# Patient Record
Sex: FEMALE | Race: WHITE | HISPANIC OR LATINO | Employment: UNEMPLOYED | URBAN - METROPOLITAN AREA
[De-identification: names, ages, dates, MRNs, and addresses within clinical notes are randomized per-mention and may not be internally consistent; named-entity substitution may affect disease eponyms.]

---

## 2023-06-29 ENCOUNTER — OFFICE VISIT (OUTPATIENT)
Dept: GASTROENTEROLOGY | Facility: AMBULARY SURGERY CENTER | Age: 33
End: 2023-06-29
Payer: COMMERCIAL

## 2023-06-29 VITALS
HEIGHT: 64 IN | OXYGEN SATURATION: 100 % | HEART RATE: 78 BPM | SYSTOLIC BLOOD PRESSURE: 122 MMHG | DIASTOLIC BLOOD PRESSURE: 70 MMHG | WEIGHT: 146 LBS | BODY MASS INDEX: 24.92 KG/M2

## 2023-06-29 DIAGNOSIS — K21.9 GASTROESOPHAGEAL REFLUX DISEASE WITHOUT ESOPHAGITIS: ICD-10-CM

## 2023-06-29 DIAGNOSIS — R10.13 DYSPEPSIA: Primary | ICD-10-CM

## 2023-06-29 PROCEDURE — 99244 OFF/OP CNSLTJ NEW/EST MOD 40: CPT | Performed by: INTERNAL MEDICINE

## 2023-06-29 RX ORDER — NORGESTIMATE AND ETHINYL ESTRADIOL 7DAYSX3 LO
1 KIT ORAL DAILY
COMMUNITY

## 2023-06-29 RX ORDER — METHOCARBAMOL 750 MG/1
TABLET, FILM COATED ORAL
COMMUNITY

## 2023-06-29 RX ORDER — FAMOTIDINE 20 MG/1
20 TABLET, FILM COATED ORAL 2 TIMES DAILY
Qty: 60 TABLET | Refills: 11 | Status: SHIPPED | OUTPATIENT
Start: 2023-06-29

## 2023-06-29 NOTE — PATIENT INSTRUCTIONS
Scheduled date of EGD(as of today): 08/23/23  Physician performing EGD: dr Rojas Whalen  Location of EGD:[facility]  Instructions reviewed with patient by:yari  Clearances:  hugo boyle

## 2023-06-29 NOTE — PROGRESS NOTES
Consultation - [facility]  Callie Ti 1990 female         Chief Complaint: GERD, discomfort in the stomach    HPI: 20-year-old female with no significant past medicine reports having issues with acid reflux for about 5 years  Complaining about chest pain with burping and acid reflux at times and also reports having discomfort in the stomach  Occasional nausea but denies any vomiting  Denies NSAID use  Good appetite, no recent weight loss  Regular bowel movements and denies any blood or mucus in the stool  Chaperon: Ms Anthony Mccracken: Review of Systems   Constitutional: Negative for activity change, appetite change, chills, diaphoresis, fatigue, fever and unexpected weight change  HENT: Negative for ear discharge, ear pain, facial swelling, hearing loss, nosebleeds, sore throat, tinnitus and voice change  Eyes: Negative for pain, discharge, redness, itching and visual disturbance  Respiratory: Negative for apnea, cough, chest tightness, shortness of breath and wheezing  Cardiovascular: Negative for chest pain and palpitations  Gastrointestinal:        As noted in HPI   Endocrine: Negative for cold intolerance, heat intolerance and polyuria  Genitourinary: Negative for difficulty urinating, dysuria, flank pain, hematuria and urgency  Musculoskeletal: Negative for arthralgias, back pain, gait problem, joint swelling and myalgias  Skin: Negative for rash and wound  Neurological: Negative for dizziness, tremors, seizures, speech difficulty, light-headedness, numbness and headaches  Hematological: Negative for adenopathy  Does not bruise/bleed easily  Psychiatric/Behavioral: Negative for agitation, behavioral problems and confusion  The patient is not nervous/anxious  Past Medical History:   Diagnosis Date   • Anxiety       History reviewed  No pertinent surgical history    Social History     Socioeconomic History   • Marital status: "/[employer]     Spouse name: Not on file   • Number of children: Not on file   • Years of education: Not on file   • Highest education level: Not on file   Occupational History   • Not on file   Tobacco Use   • Smoking status: Never   • Smokeless tobacco: Never   Vaping Use   • Vaping Use: Never used   Substance and Sexual Activity   • Alcohol use: Yes     Comment: Occs   • Drug use: Never   • Sexual activity: Not on file   Other Topics Concern   • Not on file   Social History Narrative   • Not on file     Social Determinants of Health     Financial Resource Strain: Not on file   Food Insecurity: Not on file   Transportation Needs: Not on file   Physical Activity: Not on file   Stress: Not on file   Social Connections: Not on file   Intimate Partner Violence: Not on file   Housing Stability: Not on file     History reviewed  No pertinent family history  Patient has no known allergies  Current Outpatient Medications   Medication Sig Dispense Refill   • famotidine (PEPCID) 20 mg tablet Take 1 tablet (20 mg total) by mouth 2 (two) times a day 60 tablet 11   • norgestimate-ethinyl estradiol (ORTHO TRI-CYCLEN LO) 0 18/0 215/0 25 MG-25 MCG per tablet Take 1 tablet by mouth daily     • methocarbamol (ROBAXIN) 750 mg tablet  (Patient not taking: Reported on 6/29/2023)       No current facility-administered medications for this visit  Blood pressure 122/70, pulse 78, height 5' 4\" (1 626 m), weight 66 2 kg (146 lb), SpO2 100 %  PHYSICAL EXAM: Physical Exam  Constitutional:       Appearance: Normal appearance  She is well-developed  HENT:      Head: Normocephalic and atraumatic  Nose: Nose normal    Eyes:      Conjunctiva/sclera: Conjunctivae normal    Neck:      Thyroid: No thyromegaly  Vascular: No JVD  Trachea: No tracheal deviation  Cardiovascular:      Rate and Rhythm: Normal rate and regular rhythm  Heart sounds: Normal heart sounds  No murmur heard  No friction rub   No " "gallop  Pulmonary:      Effort: Pulmonary effort is normal  No respiratory distress  Breath sounds: Normal breath sounds  No wheezing or rales  Abdominal:      General: Bowel sounds are normal  There is no distension  Palpations: Abdomen is soft  There is no mass  Tenderness: There is no abdominal tenderness  There is no guarding  Hernia: No hernia is present  Musculoskeletal:         General: No tenderness or deformity  Cervical back: Neck supple  Right lower leg: No edema  Left lower leg: No edema  Lymphadenopathy:      Cervical: No cervical adenopathy  Skin:     General: Skin is warm and dry  Findings: No erythema or rash  Neurological:      Mental Status: She is alert and oriented to person, place, and time  Psychiatric:         Mood and Affect: Mood normal          Behavior: Behavior normal          Thought Content: Thought content normal           No results found for: \"WBC\", \"HGB\", \"HCT\", \"MCV\", \"PLT\"  No results found for: \"GLUCOSE\", \"CALCIUM\", \"NA\", \"K\", \"CO2\", \"CL\", \"BUN\", \"CREATININE\"  No results found for: \"ALT\", \"AST\", \"GGT\", \"ALKPHOS\", \"BILITOT\"  No results found for: \"INR\", \"PROTIME\"    Patient was never admitted  ASSESSMENT & PLAN:    Dyspepsia  Rule out peptic ulcer disease or gastric erosions  Rule out H  pylori gastritis     -Patient was explained about the lifestyle and dietary modifications  Advised to avoid fatty foods, chocolates, caffeine, alcohol and any other triggering foods  Avoid eating for at least 3 hours before going to bed     -Scheduled for EGD    -We will put her on Pepcid twice daily    -Patient was explained about  the risks and benefits of the procedure  Risks including but not limited to bleeding, infection, perforation were explained in detail  Also explained about less than 100% sensitivity with the exam and other alternatives      Gastroesophageal reflux disease without esophagitis  Gastroesophageal reflux disease - " Patient has the symptoms of chronic acid reflux for a long time  Possible hiatal hernia or LES weakness    Should rule out Colunga's esophagus because of chronic symptoms     -Treatment plan as described above

## 2023-06-29 NOTE — ASSESSMENT & PLAN NOTE
Gastroesophageal reflux disease - Patient has the symptoms of chronic acid reflux for a long time  Possible hiatal hernia or LES weakness    Should rule out Colunga's esophagus because of chronic symptoms     -Treatment plan as described above

## 2023-06-29 NOTE — ASSESSMENT & PLAN NOTE
Rule out peptic ulcer disease or gastric erosions  Rule out H  pylori gastritis     -Patient was explained about the lifestyle and dietary modifications  Advised to avoid fatty foods, chocolates, caffeine, alcohol and any other triggering foods  Avoid eating for at least 3 hours before going to bed     -Scheduled for EGD    -We will put her on Pepcid twice daily    -Patient was explained about  the risks and benefits of the procedure  Risks including but not limited to bleeding, infection, perforation were explained in detail  Also explained about less than 100% sensitivity with the exam and other alternatives

## 2023-08-09 ENCOUNTER — TELEPHONE (OUTPATIENT)
Dept: GASTROENTEROLOGY | Facility: CLINIC | Age: 33
End: 2023-08-09

## 2023-08-16 ENCOUNTER — ANNUAL EXAM (OUTPATIENT)
Dept: OBGYN CLINIC | Facility: CLINIC | Age: 33
End: 2023-08-16
Payer: COMMERCIAL

## 2023-08-16 VITALS
DIASTOLIC BLOOD PRESSURE: 62 MMHG | SYSTOLIC BLOOD PRESSURE: 102 MMHG | WEIGHT: 149 LBS | BODY MASS INDEX: 25.44 KG/M2 | HEIGHT: 64 IN

## 2023-08-16 DIAGNOSIS — Z30.41 ENCOUNTER FOR SURVEILLANCE OF CONTRACEPTIVE PILLS: ICD-10-CM

## 2023-08-16 DIAGNOSIS — Z01.419 ENCOUNTER FOR ANNUAL ROUTINE GYNECOLOGICAL EXAMINATION: Primary | ICD-10-CM

## 2023-08-16 PROCEDURE — S0610 ANNUAL GYNECOLOGICAL EXAMINA: HCPCS | Performed by: OBSTETRICS & GYNECOLOGY

## 2023-08-16 PROCEDURE — G0476 HPV COMBO ASSAY CA SCREEN: HCPCS | Performed by: OBSTETRICS & GYNECOLOGY

## 2023-08-16 PROCEDURE — G0145 SCR C/V CYTO,THINLAYER,RESCR: HCPCS | Performed by: OBSTETRICS & GYNECOLOGY

## 2023-08-16 RX ORDER — NORGESTIMATE AND ETHINYL ESTRADIOL 7DAYSX3 LO
1 KIT ORAL DAILY
Qty: 84 TABLET | Refills: 3 | Status: SHIPPED | OUTPATIENT
Start: 2023-08-16

## 2023-08-16 NOTE — PROGRESS NOTES
Assessment/Plan:  Pap smear done as well as annual.  Encourage self breast examination as well as calcium supplementation. Reviewed breast cancer screening starting age 36 with baseline mammogram.  She will obtain more information regarding her sisters breast diagnosis in the near future. All questions answered. Continue Ortho Tri-Cyclen Lo x1 year. Return to office in 1 year or as needed  No problem-specific Assessment & Plan notes found for this encounter. Diagnoses and all orders for this visit:    Encounter for annual routine gynecological examination  -     Liquid-based pap, screening    Encounter for surveillance of contraceptive pills  -     norgestimate-ethinyl estradiol (ORTHO TRI-CYCLEN LO) 0.18/0.215/0.25 MG-25 MCG per tablet; Take 1 tablet by mouth daily          Subjective:      Patient ID: Flakita Nickerson is a 27 y.o. female. HPI     This is a very pleasant 77-year-old female  ( x3, age 13, 6, 1) presents as a new patient for GYN exam.  Patient has relocated from [place]. Her last GYN exam was approximately a year ago. She has been on Ortho Tri-Cyclen Lo since her last delivery. Her cycles are regular every 4 weeks lasting 4 days with no breakthrough bleeding. She denies any changes in bowel or bladder function. She has been  for over 14 years. She does recall having an abnormal Pap smear approximately 2 years ago with follow-up normal Pap. She believes she received the Gardasil vaccine. Regarding family history, her 57-year-old sister is undergoing breast surgery next week, uncertain if this is a breast cancer. Reviewed breast cancer screening starting at age 36 with baseline mammogram unless family history of breast cancer then earlier. Patient will keep me updated with her family history.     The following portions of the patient's history were reviewed and updated as appropriate: allergies, current medications, past family history, past medical history, past social history, past surgical history and problem list.    Review of Systems   Constitutional: Negative for fatigue, fever and unexpected weight change. Respiratory: Negative for cough, chest tightness, shortness of breath and wheezing. Cardiovascular: Negative. Negative for chest pain and palpitations. Gastrointestinal: Negative. Negative for abdominal distention, abdominal pain, blood in stool, constipation, diarrhea, nausea and vomiting. Genitourinary: Negative. Negative for difficulty urinating, dyspareunia, dysuria, flank pain, frequency, genital sores, hematuria, pelvic pain, urgency, vaginal bleeding, vaginal discharge and vaginal pain. Skin: Negative for rash. Objective:      /62   Ht 5' 4" (1.626 m)   Wt 67.6 kg (149 lb)   LMP 08/02/2023 (Exact Date)   BMI 25.58 kg/m²          Physical Exam  Constitutional:       Appearance: Normal appearance. She is well-developed. Cardiovascular:      Rate and Rhythm: Normal rate and regular rhythm. Pulmonary:      Effort: Pulmonary effort is normal.      Breath sounds: Normal breath sounds. Chest:   Breasts:     Right: No inverted nipple, mass, nipple discharge, skin change or tenderness. Left: No inverted nipple, mass, nipple discharge, skin change or tenderness. Abdominal:      General: Bowel sounds are normal. There is no distension. Palpations: Abdomen is soft. Tenderness: There is no abdominal tenderness. There is no guarding or rebound. Genitourinary:     Labia:         Right: No rash, tenderness or lesion. Left: No rash, tenderness or lesion. Vagina: Normal. No signs of injury. No vaginal discharge or tenderness. Cervix: No cervical motion tenderness, discharge, friability, lesion, erythema or cervical bleeding. Uterus: Not enlarged and not tender. Adnexa:         Right: No mass, tenderness or fullness. Left: No mass, tenderness or fullness.      Neurological: Mental Status: She is alert and oriented to person, place, and time.    Psychiatric:         Behavior: Behavior normal.

## 2023-08-17 LAB
HPV HR 12 DNA CVX QL NAA+PROBE: NEGATIVE
HPV16 DNA CVX QL NAA+PROBE: NEGATIVE
HPV18 DNA CVX QL NAA+PROBE: NEGATIVE

## 2023-08-22 LAB
LAB AP GYN PRIMARY INTERPRETATION: NORMAL
Lab: NORMAL

## 2023-09-21 ENCOUNTER — TELEPHONE (OUTPATIENT)
Dept: GASTROENTEROLOGY | Facility: CLINIC | Age: 33
End: 2023-09-21

## 2023-10-24 ENCOUNTER — ANESTHESIA EVENT (OUTPATIENT)
Dept: ANESTHESIOLOGY | Facility: HOSPITAL | Age: 33
End: 2023-10-24

## 2023-10-24 ENCOUNTER — ANESTHESIA (OUTPATIENT)
Dept: ANESTHESIOLOGY | Facility: HOSPITAL | Age: 33
End: 2023-10-24

## 2023-11-07 ENCOUNTER — ANESTHESIA (OUTPATIENT)
Dept: GASTROENTEROLOGY | Facility: AMBULARY SURGERY CENTER | Age: 33
End: 2023-11-07

## 2023-11-07 ENCOUNTER — ANESTHESIA EVENT (OUTPATIENT)
Dept: GASTROENTEROLOGY | Facility: AMBULARY SURGERY CENTER | Age: 33
End: 2023-11-07

## 2023-11-07 ENCOUNTER — HOSPITAL ENCOUNTER (OUTPATIENT)
Dept: GASTROENTEROLOGY | Facility: AMBULARY SURGERY CENTER | Age: 33
Setting detail: OUTPATIENT SURGERY
Discharge: HOME | End: 2023-11-07
Attending: INTERNAL MEDICINE
Payer: COMMERCIAL

## 2023-11-07 VITALS
SYSTOLIC BLOOD PRESSURE: 100 MMHG | TEMPERATURE: 97.3 F | HEART RATE: 81 BPM | RESPIRATION RATE: 18 BRPM | OXYGEN SATURATION: 100 % | DIASTOLIC BLOOD PRESSURE: 57 MMHG | BODY MASS INDEX: 25.69 KG/M2 | HEIGHT: 63 IN | WEIGHT: 145 LBS

## 2023-11-07 DIAGNOSIS — K21.9 GASTROESOPHAGEAL REFLUX DISEASE WITHOUT ESOPHAGITIS: ICD-10-CM

## 2023-11-07 DIAGNOSIS — R10.13 DYSPEPSIA: ICD-10-CM

## 2023-11-07 LAB
EXT PREGNANCY TEST URINE: NEGATIVE
EXT. CONTROL: NORMAL

## 2023-11-07 PROCEDURE — 81025 URINE PREGNANCY TEST: CPT | Performed by: ANESTHESIOLOGY

## 2023-11-07 PROCEDURE — 43239 EGD BIOPSY SINGLE/MULTIPLE: CPT | Performed by: INTERNAL MEDICINE

## 2023-11-07 PROCEDURE — 88305 TISSUE EXAM BY PATHOLOGIST: CPT | Performed by: PATHOLOGY

## 2023-11-07 RX ORDER — SODIUM CHLORIDE, SODIUM LACTATE, POTASSIUM CHLORIDE, CALCIUM CHLORIDE 600; 310; 30; 20 MG/100ML; MG/100ML; MG/100ML; MG/100ML
125 INJECTION, SOLUTION INTRAVENOUS CONTINUOUS
Status: DISCONTINUED | OUTPATIENT
Start: 2023-11-07 | End: 2023-11-11 | Stop reason: HOSPADM

## 2023-11-07 RX ORDER — SODIUM CHLORIDE, SODIUM LACTATE, POTASSIUM CHLORIDE, CALCIUM CHLORIDE 600; 310; 30; 20 MG/100ML; MG/100ML; MG/100ML; MG/100ML
20 INJECTION, SOLUTION INTRAVENOUS CONTINUOUS
Status: CANCELLED | OUTPATIENT
Start: 2023-11-07

## 2023-11-07 RX ORDER — PROPOFOL 10 MG/ML
INJECTION, EMULSION INTRAVENOUS CONTINUOUS PRN
Status: DISCONTINUED | OUTPATIENT
Start: 2023-11-07 | End: 2023-11-07

## 2023-11-07 RX ORDER — PROPOFOL 10 MG/ML
INJECTION, EMULSION INTRAVENOUS AS NEEDED
Status: DISCONTINUED | OUTPATIENT
Start: 2023-11-07 | End: 2023-11-07

## 2023-11-07 RX ORDER — ONDANSETRON 2 MG/ML
4 INJECTION INTRAMUSCULAR; INTRAVENOUS ONCE AS NEEDED
Status: CANCELLED | OUTPATIENT
Start: 2023-11-07

## 2023-11-07 RX ADMIN — PROPOFOL 100 MCG/KG/MIN: 10 INJECTION, EMULSION INTRAVENOUS at 13:47

## 2023-11-07 RX ADMIN — PROPOFOL 150 MG: 10 INJECTION, EMULSION INTRAVENOUS at 13:47

## 2023-11-07 RX ADMIN — SODIUM CHLORIDE, SODIUM LACTATE, POTASSIUM CHLORIDE, AND CALCIUM CHLORIDE: .6; .31; .03; .02 INJECTION, SOLUTION INTRAVENOUS at 13:40

## 2023-11-07 NOTE — H&P
History and Physical -  [facility]  Shu Puente 26 y.o. female MRN: [id number]        HPI: 41-year-old female with history of anxiety, GERD reports having some discomfort in the stomach. Historical Information   Past Medical History:   Diagnosis Date    Anxiety      Past Surgical History:   Procedure Laterality Date    INDUCED        Social History   Social History     Substance and Sexual Activity   Alcohol Use Yes    Comment: Occs     Social History     Substance and Sexual Activity   Drug Use Never     Social History     Tobacco Use   Smoking Status Never   Smokeless Tobacco Never     Family History   Problem Relation Age of Onset    Hypertension Father        Meds/Allergies     (Not in a hospital admission)      No Known Allergies    Objective     Last menstrual period 10/24/2023.     PHYSICAL EXAM:    Gen: NAD  CV: S1 & S2 normal, RRR  CHEST: Clear to auscultate  ABD: soft, NT/ND, good bowel sounds  EXT: no edema    ASSESSMENT:     GERD, dyspepsia    PLAN:    EGD

## 2023-11-07 NOTE — ANESTHESIA POSTPROCEDURE EVALUATION
Post-Op Assessment Note    CV Status:  Stable  Pain Score: 0    Pain management: adequate     Mental Status:  Alert and awake   Hydration Status:  Stable   PONV Controlled:  None   Airway Patency:  Patent      Post Op Vitals Reviewed: Yes      Staff: CRNA         No notable events documented.     /60 (11/07/23 1354)    Temp (!) 97.3 °F (36.3 °C) (11/07/23 1354)    Pulse 77 (11/07/23 1354)   Resp 18 (11/07/23 1354)    SpO2 99 % (11/07/23 1354)

## 2023-11-07 NOTE — ANESTHESIA PREPROCEDURE EVALUATION
Procedure:  EGD    Relevant Problems   GI/HEPATIC   (+) Gastroesophageal reflux disease without esophagitis      Other   (+) Dyspepsia        Physical Exam    Airway    Mallampati score: III  TM Distance: >3 FB  Neck ROM: full     Dental       Cardiovascular      Pulmonary      Other Findings        Anesthesia Plan  ASA Score- 2     Anesthesia Type- IV sedation with anesthesia with ASA Monitors. Additional Monitors:     Airway Plan:            Plan Factors-    Chart reviewed. Existing labs reviewed. Patient summary reviewed. Induction- intravenous. Postoperative Plan-     Informed Consent- Anesthetic plan and risks discussed with patient. I personally reviewed this patient with the CRNA. Discussed and agreed on the Anesthesia Plan with the CRNA. Jaclyn Kauffman

## 2023-11-10 ENCOUNTER — NURSE TRIAGE (OUTPATIENT)
Age: 33
End: 2023-11-10

## 2023-11-10 DIAGNOSIS — A04.8 H. PYLORI INFECTION: Primary | ICD-10-CM

## 2023-11-10 PROCEDURE — 88305 TISSUE EXAM BY PATHOLOGIST: CPT | Performed by: PATHOLOGY

## 2023-11-10 RX ORDER — AMOXICILLIN 500 MG/1
1000 CAPSULE ORAL EVERY 12 HOURS SCHEDULED
Qty: 56 CAPSULE | Refills: 0 | Status: SHIPPED | OUTPATIENT
Start: 2023-11-10 | End: 2023-11-13 | Stop reason: SDUPTHER

## 2023-11-10 RX ORDER — CLARITHROMYCIN 500 MG/1
500 TABLET, COATED ORAL EVERY 12 HOURS SCHEDULED
Qty: 28 TABLET | Refills: 0 | Status: SHIPPED | OUTPATIENT
Start: 2023-11-10 | End: 2023-11-24

## 2023-11-10 RX ORDER — PANTOPRAZOLE SODIUM 40 MG/1
40 TABLET, DELAYED RELEASE ORAL
Qty: 28 TABLET | Refills: 0 | Status: SHIPPED | OUTPATIENT
Start: 2023-11-10 | End: 2023-11-24

## 2023-11-10 NOTE — TELEPHONE ENCOUNTER
I spoke with patient and reviewed result note, future stool test, confirmed pharmacy. Please sign off on meds.

## 2023-11-10 NOTE — TELEPHONE ENCOUNTER
Regarding: results  ----- Message from [facility] sent at 11/10/2023  3:20 PM EST -----  Patients GI provider:  Dr. Cecilia Galvan     Number to return call: [phone]    Reason for call: Pt calling requesting to speak with someone to go over results of endoscopy    Scheduled procedure/appointment date if applicable: Apt/procedure

## 2023-11-10 NOTE — TELEPHONE ENCOUNTER
I spoke with patient and advised results have not been formally reviewed by physician to date. They did drop into her MyChart. She questioned if she would need treatment and I advised that orders would be placed after review. Answer Assessment - Initial Assessment Questions  1. REASON FOR CALL or QUESTION: "What is your reason for calling today?" or "How can I best help you?" or "What question do you have that I can help answer?"      Patient requests path results. Protocols used:  Information Only Call - No Triage-ADULT-[place]

## 2023-11-13 ENCOUNTER — TELEPHONE (OUTPATIENT)
Age: 33
End: 2023-11-13

## 2023-11-13 DIAGNOSIS — A04.8 H. PYLORI INFECTION: ICD-10-CM

## 2023-11-13 RX ORDER — AMOXICILLIN 500 MG/1
1000 CAPSULE ORAL EVERY 12 HOURS SCHEDULED
Qty: 56 CAPSULE | Refills: 0 | Status: SHIPPED | OUTPATIENT
Start: 2023-11-13 | End: 2023-11-27

## 2023-11-13 NOTE — TELEPHONE ENCOUNTER
Prescription for Amox sent as phone in, I have amended to normal. Please sign off to release to pharmacy. Patient is aware.

## 2023-11-13 NOTE — TELEPHONE ENCOUNTER
Patients GI provider:  Cayden Pruett    Number to return call: [phone]    Reason for call: Pt calling stating she was prescribed 3 medications and pharmacy only has 2. Pt  states they do not have amoxicillin. Pt states she was advised to take these 3 medications together after eating and has not ate because of that. Please advise.      Scheduled procedure/appointment date if applicable: none

## 2023-12-13 ENCOUNTER — NURSE TRIAGE (OUTPATIENT)
Age: 33
End: 2023-12-13

## 2023-12-13 NOTE — TELEPHONE ENCOUNTER
PT GIVEN INSTRUCTIONS SHE CAN SUBMIT STOOL SAMPLE, ORDER IS IN EPIC. Reason for Disposition   Information only question and nurse able to answer    Answer Assessment - Initial Assessment Questions  1. REASON FOR CALL or QUESTION: "What is your reason for calling today?" or "How can I best help you?" or "What question do you have that I can help answer?"      PT INQUIRING ABOUT STOOL TEST FOR H. PYLORI. Protocols used:  Information Only Call - No Triage-ADULT-[place]

## 2024-01-18 ENCOUNTER — NURSE TRIAGE (OUTPATIENT)
Age: 34
End: 2024-01-18

## 2024-01-18 NOTE — TELEPHONE ENCOUNTER
"Pt calling in asking for h pylori stool results. I do not see order was completed, pt explained she went to [facility] out patient lab and submitted stool study weeks ago. She is asking for us to call lab to find out what happened. I called outpt lab and they do not have any record of stool study and pt will need to redo it.     I tried to call pt back but reached . When pt calls back please let her know to resubmit stool studies.   Reason for Disposition   Information only question and nurse able to answer    Answer Assessment - Initial Assessment Questions  1. REASON FOR CALL or QUESTION: \"What is your reason for calling today?\" or \"How can I best help you?\" or \"What question do you have that I can help answer?\"      Results of h pylori stool    Protocols used: Information Only Call - No Triage-ADULT-[place]    "

## 2024-04-12 NOTE — TELEPHONE ENCOUNTER
Patient called requesting refill for ORTHO TRI-CYCLEN LO. Patient made aware medication was refilled on 8/16/23 for 84 with 3 refills to [facility] pharmacy. Patient instructed to contact the pharmacy to obtain refills of medication. Patient verbalized understanding.

## 2024-05-15 ENCOUNTER — HOSPITAL ENCOUNTER (OUTPATIENT)
Dept: RADIOLOGY | Facility: HOSPITAL | Age: 34
Discharge: HOME | End: 2024-05-15
Payer: COMMERCIAL

## 2024-05-15 DIAGNOSIS — R59.0 LOCALIZED ENLARGED LYMPH NODES: ICD-10-CM

## 2024-05-15 PROCEDURE — 76536 US EXAM OF HEAD AND NECK: CPT

## 2024-05-17 ENCOUNTER — APPOINTMENT (OUTPATIENT)
Dept: LAB | Facility: CLINIC | Age: 34
End: 2024-05-17
Payer: COMMERCIAL

## 2024-05-17 DIAGNOSIS — A04.8 H. PYLORI INFECTION: ICD-10-CM

## 2024-05-17 PROCEDURE — 87338 HPYLORI STOOL AG IA: CPT

## 2024-05-18 LAB — H PYLORI AG STL QL IA: NEGATIVE

## 2024-07-03 DIAGNOSIS — Z30.41 ENCOUNTER FOR SURVEILLANCE OF CONTRACEPTIVE PILLS: ICD-10-CM

## 2024-07-03 RX ORDER — NORGESTIMATE AND ETHINYL ESTRADIOL 7DAYSX3 LO
1 KIT ORAL DAILY
Qty: 84 TABLET | Refills: 1 | Status: SHIPPED | OUTPATIENT
Start: 2024-07-03

## 2024-07-03 NOTE — TELEPHONE ENCOUNTER
Patient stated she only has enough until Saturday    Reason for call:   [x] Refill   [] Prior Auth  [] Other:     Office:   [] PCP/Provider -   [x] Specialty/Provider - ob/gyn    Medication: norgestimate-ethinyl estradiol (ORTHO TRI-CYCLEN LO) 0.18/0.215/0.25 MG-25 MCG per tablet     Dose/Frequency: 1 tablet, Oral, Daily     Quantity: 84    Pharmacy: [id number] - [place], [place] [address]      Does the patient have enough for 3 days?   [] Yes   [x] No - Send as HP to POD

## 2024-07-30 ENCOUNTER — ANNUAL EXAM (OUTPATIENT)
Dept: OBGYN CLINIC | Facility: CLINIC | Age: 34
End: 2024-07-30
Payer: COMMERCIAL

## 2024-07-30 VITALS
BODY MASS INDEX: 24.82 KG/M2 | HEIGHT: 64 IN | SYSTOLIC BLOOD PRESSURE: 100 MMHG | WEIGHT: 145.4 LBS | DIASTOLIC BLOOD PRESSURE: 66 MMHG

## 2024-07-30 DIAGNOSIS — Z12.31 ENCOUNTER FOR SCREENING MAMMOGRAM FOR MALIGNANT NEOPLASM OF BREAST: ICD-10-CM

## 2024-07-30 DIAGNOSIS — Z80.3 FAMILY HISTORY OF BREAST CANCER IN FIRST DEGREE RELATIVE: ICD-10-CM

## 2024-07-30 DIAGNOSIS — Z30.41 ENCOUNTER FOR SURVEILLANCE OF CONTRACEPTIVE PILLS: ICD-10-CM

## 2024-07-30 DIAGNOSIS — Z01.419 ENCOUNTER FOR ANNUAL ROUTINE GYNECOLOGICAL EXAMINATION: Primary | ICD-10-CM

## 2024-07-30 PROCEDURE — S0612 ANNUAL GYNECOLOGICAL EXAMINA: HCPCS | Performed by: OBSTETRICS & GYNECOLOGY

## 2024-07-30 RX ORDER — NORGESTIMATE AND ETHINYL ESTRADIOL 7DAYSX3 LO
1 KIT ORAL DAILY
Qty: 84 TABLET | Refills: 3 | Status: SHIPPED | OUTPATIENT
Start: 2024-07-30

## 2024-07-30 RX ORDER — MULTIVITAMIN
1 TABLET ORAL DAILY
COMMUNITY

## 2024-07-30 NOTE — PROGRESS NOTES
Ambulatory Visit  Name: Seble Cleary      : 1990      MRN: [id number]  Encounter Provider: Merna Anderson DO  Encounter Date: 2024   Encounter department: OB GYN [facility]    Assessment & Plan   1. Encounter for annual routine gynecological examination  2. Encounter for screening mammogram for malignant neoplasm of breast  -     Mammo screening bilateral w 3d & cad; Future  3. Family history of breast cancer in first degree relative  -     Mammo screening bilateral w 3d & cad; Future  4. Encounter for surveillance of contraceptive pills  -     norgestimate-ethinyl estradiol (ORTHO TRI-CYCLEN LO) 0.18/0.215/0.25 MG-25 MCG per tablet; Take 1 tablet by mouth daily    Pap deferred due to low risk status.  Encouraged self breast examination as well as calcium supplementation.  Recommend baseline mammogram, high risk of significant family history of breast cancer.  She will continue Ortho Tri-Cyclen Lo.  Return to office in 1 year or as needed      History of Present Illness     Seble Cleary is a 33 y.o. female who presents     This is a pleasant 33-year-old female P3 ( x 3, age 12, 9, 3) presents for her GYN exam.  She states her cycles are regular every 4 weeks lasting 5 days with no breakthrough bleeding.  She denies any changes in bowel or bladder function.  She has been in a monogamous relationship with her  for over 16 years.  She does recall abnormal Pap smear approximately 3 years ago.  She did receive the Gardasil vaccine.    Family history significant for 36-year-old sister who was diagnosed with breast cancer last year underwent mastectomy with radiation.  She underwent genetic testing which was negative.    Review of Systems   Constitutional:  Negative for fatigue, fever and unexpected weight change.   Respiratory:  Negative for cough, chest tightness, shortness of breath and wheezing.    Cardiovascular: Negative.  Negative for chest pain and palpitations.  "  Gastrointestinal: Negative.  Negative for abdominal distention, abdominal pain, blood in stool, constipation, diarrhea, nausea and vomiting.   Genitourinary: Negative.  Negative for difficulty urinating, dyspareunia, dysuria, flank pain, frequency, genital sores, hematuria, pelvic pain, urgency, vaginal bleeding, vaginal discharge and vaginal pain.   Skin:  Negative for rash.     Current Outpatient Medications on File Prior to Visit   Medication Sig Dispense Refill    Multiple Vitamin (multivitamin) tablet Take 1 tablet by mouth daily gummie      [DISCONTINUED] norgestimate-ethinyl estradiol (ORTHO TRI-CYCLEN LO) 0.18/0.215/0.25 MG-25 MCG per tablet Take 1 tablet by mouth daily 84 tablet 1    famotidine (PEPCID) 20 mg tablet Take 1 tablet (20 mg total) by mouth 2 (two) times a day (Patient not taking: Reported on 7/30/2024) 60 tablet 11    pantoprazole (PROTONIX) 40 mg tablet Take 1 tablet (40 mg total) by mouth 2 (two) times a day before meals for 14 days 28 tablet 0     No current facility-administered medications on file prior to visit.      Objective     /66   Ht 5' 4\" (1.626 m)   Wt 66 kg (145 lb 6.4 oz)   LMP 07/30/2024 (Exact Date)   BMI 24.96 kg/m²     Physical Exam  Constitutional:       Appearance: Normal appearance. She is well-developed.   HENT:      Head: Normocephalic and atraumatic.   Cardiovascular:      Rate and Rhythm: Normal rate and regular rhythm.   Pulmonary:      Effort: Pulmonary effort is normal.      Breath sounds: Normal breath sounds.   Chest:   Breasts:     Right: No inverted nipple, mass, nipple discharge, skin change or tenderness.      Left: No inverted nipple, mass, nipple discharge, skin change or tenderness.   Abdominal:      General: Bowel sounds are normal. There is no distension.      Palpations: Abdomen is soft.      Tenderness: There is no abdominal tenderness. There is no guarding or rebound.   Genitourinary:     Labia:         Right: No rash, tenderness or " lesion.         Left: No rash, tenderness or lesion.       Vagina: Normal. No signs of injury. No vaginal discharge or tenderness.      Cervix: No cervical motion tenderness, discharge, friability, lesion or cervical bleeding.      Uterus: Not enlarged and not tender.       Adnexa:         Right: No mass, tenderness or fullness.          Left: No mass, tenderness or fullness.     Neurological:      Mental Status: She is alert.   Psychiatric:         Behavior: Behavior normal.       Administrative Statements   I have spent a total time of 25 minutes in caring for this patient on the day of the visit/encounter including Impressions, Counseling / Coordination of care, Documenting in the medical record, Reviewing / ordering tests, medicine, procedures  , and Obtaining or reviewing history  .

## 2024-11-27 ENCOUNTER — TELEPHONE (OUTPATIENT)
Dept: OBGYN CLINIC | Facility: CLINIC | Age: 34
End: 2024-11-27

## 2024-11-27 NOTE — TELEPHONE ENCOUNTER
Patient called requesting refill for norgestimate-ethinyl estradiol (ORTHO TRI-CYCLEN LO) 0.18/0.215/0.25 MG-25 MCG . Patient made aware medication was refilled on 07/30/24 for 84 with 3 refills to [facility] pharmacy. Patient instructed to contact the pharmacy to obtain refills of medication. Patient verbalized understanding.

## 2025-03-12 DIAGNOSIS — Z30.41 ENCOUNTER FOR SURVEILLANCE OF CONTRACEPTIVE PILLS: ICD-10-CM

## 2025-03-12 RX ORDER — NORGESTIMATE AND ETHINYL ESTRADIOL 7DAYSX3 LO
1 KIT ORAL DAILY
Qty: 84 TABLET | Refills: 1 | Status: SHIPPED | OUTPATIENT
Start: 2025-03-12

## 2025-03-12 NOTE — TELEPHONE ENCOUNTER
Reason for call:   [x] Refill   [] Prior Auth  [] Other:     Office:   [] PCP/Provider -   [x] Specialty/Provider - OB GYN    Medication: (ORTHO TRI-CYCLEN LO) 0.18/0.215/0.25 MG-25 MCG per tablet       Dose/Frequency:  Take 1 tablet by mouth daily,     Quantity: 84 TABLET    Pharmacy: [id number] [place] -     Local Pharmacy   Does the patient have enough for 3 days?   [x] Yes   [] No - Send as HP to POD    Mail Away Pharmacy   Does the patient have enough for 10 days?   [] Yes   [] No - Send as HP to POD

## 2025-05-07 ENCOUNTER — OFFICE VISIT (OUTPATIENT)
Dept: HEMATOLOGY ONCOLOGY | Facility: CLINIC | Age: 35
End: 2025-05-07
Payer: COMMERCIAL

## 2025-05-07 VITALS
HEART RATE: 96 BPM | OXYGEN SATURATION: 98 % | DIASTOLIC BLOOD PRESSURE: 62 MMHG | SYSTOLIC BLOOD PRESSURE: 116 MMHG | TEMPERATURE: 98.4 F | WEIGHT: 146 LBS | HEIGHT: 64 IN | BODY MASS INDEX: 24.92 KG/M2 | RESPIRATION RATE: 16 BRPM

## 2025-05-07 DIAGNOSIS — R76.8 ANA POSITIVE: ICD-10-CM

## 2025-05-07 DIAGNOSIS — E61.0 COPPER DEFICIENCY: ICD-10-CM

## 2025-05-07 DIAGNOSIS — R79.0 LOW FERRITIN: ICD-10-CM

## 2025-05-07 DIAGNOSIS — D70.9 NEUTROPENIA, UNSPECIFIED TYPE (HCC): Primary | ICD-10-CM

## 2025-05-07 DIAGNOSIS — E53.8 B12 DEFICIENCY: ICD-10-CM

## 2025-05-07 LAB
CRP SERPL-MCNC: 3.5 MG/L
ERYTHROCYTE [SEDIMENTATION RATE] IN BLOOD BY WESTERGREN METHOD: 6 MM/HR (ref 0–20)
FERRITIN SERPL-MCNC: 7 NG/ML (ref 11–306.8)
IRON SATN MFR SERPL: 15 % (ref 20–50)
IRON SERPL-MCNC: 71 UG/DL (ref 50–212)
RHEUMATOID FACT SER QL LA: <10 IU/ML
TIBC SERPL-MCNC: 484 UG/DL (ref 260–430)
TRANSFERRIN SERPL-MCNC: 346 MG/DL (ref 203–362)

## 2025-05-07 PROCEDURE — 99244 OFF/OP CNSLTJ NEW/EST MOD 40: CPT | Performed by: NURSE PRACTITIONER

## 2025-05-07 NOTE — PATIENT INSTRUCTIONS
Please start taking vitamin B12 supplement such as natures made gummy 3000 mcg once daily for 2 months then decrease to 1 gummy daily.

## 2025-05-07 NOTE — PROGRESS NOTES
Name: Seble Cleary      : 1990      MRN: [id number]  Encounter Provider: ALEX Nicholas  Encounter Date: 2025   Encounter department: [facility] [place]  :  Assessment & Plan  Neutropenia, unspecified type (HCC)   Neutropenia has been present since at least .  Discussed etiology Differential diagnoses for neutropenia includes but is not limited to nutritional deficiencies, benign ethnic neutropenia, drug-induced neutropenia, viral infections, or bone marrow pathology. Upon medication review, it is unlikely that any of  medications are causing neutropenia. I recommend comprehensive work-up for secondary neutropenia with the following labs.  Patient denies fevers, chills, abnormal weight loss, night sweats.  She endorses fatigue, arthralgias, and decreased concentration.    Orders:    Copper Level; Future    C-reactive protein; Future    Iron Panel (Includes Ferritin, Iron Sat%, Iron, and TIBC); Future    Leukemia/Lymphoma flow cytometry; Future    Sedimentation rate, automated; Future    RHEUMATOID FACTOR; Future    TYRON positive     Orders:    C-reactive protein; Future    Sedimentation rate, automated; Future    RHEUMATOID FACTOR; Future    Low ferritin    Orders:    Iron Panel (Includes Ferritin, Iron Sat%, Iron, and TIBC); Future    Copper deficiency    Orders:    Copper Level; Future    B12 deficiency  Advised patient to start taking a vitamin B12 supplement such as natures made gummy 3000 mcg once daily for 2 months then decrease to 1 gummy daily.           Return in about 3 months (around 2025) for Office Visit.    History of Present Illness   Chief Complaint   Patient presents with    Consult   Patient referred for evaluation of neutropenia.    Pertinent Medical History     2025: Patient is a 34-year-old female with a history of GERD, and dyspepsia.  She has had a history of neutropenia with a WBC ranging between 2.8 to 3.3 since at least  "2022.  ANC 0.8-1.2, otherwise normal CBC and differential.  PCP workup showed vitamin B12 deficiency with a B12 level of 251, folate normal, TYRON was positive however  reflex negative.     Review of Systems   Constitutional:  Positive for fatigue. Negative for activity change, appetite change, fever and unexpected weight change.   Respiratory:  Negative for cough and shortness of breath.    Cardiovascular:  Negative for chest pain and leg swelling.   Gastrointestinal:  Negative for abdominal pain, constipation, diarrhea and nausea.   Endocrine: Negative for cold intolerance and heat intolerance.   Musculoskeletal:  Positive for arthralgias. Negative for myalgias.   Skin: Negative.    Neurological:  Positive for headaches. Negative for dizziness and weakness.   Hematological:  Negative for adenopathy. Does not bruise/bleed easily.   Psychiatric/Behavioral:  Positive for decreased concentration.        Objective   /62 (BP Location: Left arm, Patient Position: Sitting, Cuff Size: Adult)   Pulse 96   Temp 98.4 °F (36.9 °C) (Temporal)   Resp 16   Ht 5' 4\" (1.626 m)   Wt 66.2 kg (146 lb)   SpO2 98%   BMI 25.06 kg/m²     Physical Exam  Vitals reviewed.   Constitutional:       Appearance: Normal appearance. She is well-developed.   HENT:      Head: Normocephalic and atraumatic.   Eyes:      Conjunctiva/sclera: Conjunctivae normal.      Pupils: Pupils are equal, round, and reactive to light.   Pulmonary:      Effort: Pulmonary effort is normal. No respiratory distress.   Musculoskeletal:         General: Normal range of motion.      Cervical back: Normal range of motion.   Lymphadenopathy:      Cervical: No cervical adenopathy.   Skin:     General: Skin is dry.   Neurological:      Mental Status: She is alert and oriented to person, place, and time.   Psychiatric:         Behavior: Behavior normal.     Labs: I have reviewed the following labs:  Results for orders placed or performed in visit on 05/17/24   H. " pylori antigen, stool   Result Value Ref Range    H pylori Ag, Stl Negative Negative

## 2025-05-07 NOTE — LETTER
May 13, 2025     No Recipients    Patient: Seble Cleary   YOB: 1990   Date of Visit: 2025       Dear Dr. Mitchell Recipients:    Thank you for referring Seble Cleary to me for evaluation. Below are my notes for this consultation.    If you have questions, please do not hesitate to call me. I look forward to following your patient along with you.         Sincerely,        ALEX Nicholas        CC: No Recipients    ALEX Nicholas  2025  1:37 PM  Sign when Signing Visit  Name: Seble Cleary      : 1990      MRN: [id number]  Encounter Provider: ALEX Nicholas  Encounter Date: 2025   Encounter department: [facility] [place]  :  Assessment & Plan  Neutropenia, unspecified type (HCC)   Neutropenia has been present since at least .  Discussed etiology Differential diagnoses for neutropenia includes but is not limited to nutritional deficiencies, benign ethnic neutropenia, drug-induced neutropenia, viral infections, or bone marrow pathology. Upon medication review, it is unlikely that any of  medications are causing neutropenia. I recommend comprehensive work-up for secondary neutropenia with the following labs.  Patient denies fevers, chills, abnormal weight loss, night sweats.  She endorses fatigue, arthralgias, and decreased concentration.    Orders:  •  Copper Level; Future  •  C-reactive protein; Future  •  Iron Panel (Includes Ferritin, Iron Sat%, Iron, and TIBC); Future  •  Leukemia/Lymphoma flow cytometry; Future  •  Sedimentation rate, automated; Future  •  RHEUMATOID FACTOR; Future    TYRON positive     Orders:  •  C-reactive protein; Future  •  Sedimentation rate, automated; Future  •  RHEUMATOID FACTOR; Future    Low ferritin    Orders:  •  Iron Panel (Includes Ferritin, Iron Sat%, Iron, and TIBC); Future    Copper deficiency    Orders:  •  Copper Level; Future    B12 deficiency  Advised patient to start taking a vitamin  "B12 supplement such as natures made gummy 3000 mcg once daily for 2 months then decrease to 1 gummy daily.           Return in about 3 months (around 8/11/2025) for Office Visit.    History of Present Illness  Chief Complaint   Patient presents with   • Consult   Patient referred for evaluation of neutropenia.    Pertinent Medical History    05/07/2025: Patient is a 34-year-old female with a history of GERD, and dyspepsia.  She has had a history of neutropenia with a WBC ranging between 2.8 to 3.3 since at least 2022.  ANC 0.8-1.2, otherwise normal CBC and differential.  PCP workup showed vitamin B12 deficiency with a B12 level of 251, folate normal, TYRON was positive however  reflex negative.     Review of Systems   Constitutional:  Positive for fatigue. Negative for activity change, appetite change, fever and unexpected weight change.   Respiratory:  Negative for cough and shortness of breath.    Cardiovascular:  Negative for chest pain and leg swelling.   Gastrointestinal:  Negative for abdominal pain, constipation, diarrhea and nausea.   Endocrine: Negative for cold intolerance and heat intolerance.   Musculoskeletal:  Positive for arthralgias. Negative for myalgias.   Skin: Negative.    Neurological:  Positive for headaches. Negative for dizziness and weakness.   Hematological:  Negative for adenopathy. Does not bruise/bleed easily.   Psychiatric/Behavioral:  Positive for decreased concentration.        Objective  /62 (BP Location: Left arm, Patient Position: Sitting, Cuff Size: Adult)   Pulse 96   Temp 98.4 °F (36.9 °C) (Temporal)   Resp 16   Ht 5' 4\" (1.626 m)   Wt 66.2 kg (146 lb)   SpO2 98%   BMI 25.06 kg/m²     Physical Exam  Vitals reviewed.   Constitutional:       Appearance: Normal appearance. She is well-developed.   HENT:      Head: Normocephalic and atraumatic.   Eyes:      Conjunctiva/sclera: Conjunctivae normal.      Pupils: Pupils are equal, round, and reactive to light.   Pulmonary: "      Effort: Pulmonary effort is normal. No respiratory distress.   Musculoskeletal:         General: Normal range of motion.      Cervical back: Normal range of motion.   Lymphadenopathy:      Cervical: No cervical adenopathy.   Skin:     General: Skin is dry.   Neurological:      Mental Status: She is alert and oriented to person, place, and time.   Psychiatric:         Behavior: Behavior normal.     Labs: I have reviewed the following labs:  Results for orders placed or performed in visit on 05/17/24   H. pylori antigen, stool   Result Value Ref Range    H pylori Ag, Stl Negative Negative   { AMB ONCOLOGY LABS (Optional):31849}    {Radiology Results Review (Optional):30368}    {Administrative / Billing Section (Optional):80520}

## 2025-05-07 NOTE — LETTER
May 13, 2025     ALEX Hernandez  [address]  [address]  [place] [address]    Patient: Seble Cleary   YOB: 1990   Date of Visit: 2025       Dear ALEX Aguiar:    Thank you for referring Seble Cleary to me for evaluation. Below are my notes for this consultation.    If you have questions, please do not hesitate to call me. I look forward to following your patient along with you.         Sincerely,        ALEX Nicholas        CC: No Recipients    ALEX Nicholas  2025  1:37 PM  Sign when Signing Visit  Name: Seble Cleary      : 1990      MRN: [id number]  Encounter Provider: ALEX Nicholas  Encounter Date: 2025   Encounter department: [facility] [place]  :  Assessment & Plan  Neutropenia, unspecified type (HCC)   Neutropenia has been present since at least .  Discussed etiology Differential diagnoses for neutropenia includes but is not limited to nutritional deficiencies, benign ethnic neutropenia, drug-induced neutropenia, viral infections, or bone marrow pathology. Upon medication review, it is unlikely that any of  medications are causing neutropenia. I recommend comprehensive work-up for secondary neutropenia with the following labs.  Patient denies fevers, chills, abnormal weight loss, night sweats.  She endorses fatigue, arthralgias, and decreased concentration.    Orders:  •  Copper Level; Future  •  C-reactive protein; Future  •  Iron Panel (Includes Ferritin, Iron Sat%, Iron, and TIBC); Future  •  Leukemia/Lymphoma flow cytometry; Future  •  Sedimentation rate, automated; Future  •  RHEUMATOID FACTOR; Future    TYRON positive     Orders:  •  C-reactive protein; Future  •  Sedimentation rate, automated; Future  •  RHEUMATOID FACTOR; Future    Low ferritin    Orders:  •  Iron Panel (Includes Ferritin, Iron Sat%, Iron, and TIBC); Future    Copper deficiency    Orders:  •  Copper Level;  "Future    B12 deficiency  Advised patient to start taking a vitamin B12 supplement such as natures made gummy 3000 mcg once daily for 2 months then decrease to 1 gummy daily.           Return in about 3 months (around 8/11/2025) for Office Visit.    History of Present Illness  Chief Complaint   Patient presents with   • Consult   Patient referred for evaluation of neutropenia.    Pertinent Medical History    05/07/2025: Patient is a 34-year-old female with a history of GERD, and dyspepsia.  She has had a history of neutropenia with a WBC ranging between 2.8 to 3.3 since at least 2022.  ANC 0.8-1.2, otherwise normal CBC and differential.  PCP workup showed vitamin B12 deficiency with a B12 level of 251, folate normal, TYRON was positive however  reflex negative.     Review of Systems   Constitutional:  Positive for fatigue. Negative for activity change, appetite change, fever and unexpected weight change.   Respiratory:  Negative for cough and shortness of breath.    Cardiovascular:  Negative for chest pain and leg swelling.   Gastrointestinal:  Negative for abdominal pain, constipation, diarrhea and nausea.   Endocrine: Negative for cold intolerance and heat intolerance.   Musculoskeletal:  Positive for arthralgias. Negative for myalgias.   Skin: Negative.    Neurological:  Positive for headaches. Negative for dizziness and weakness.   Hematological:  Negative for adenopathy. Does not bruise/bleed easily.   Psychiatric/Behavioral:  Positive for decreased concentration.        Objective  /62 (BP Location: Left arm, Patient Position: Sitting, Cuff Size: Adult)   Pulse 96   Temp 98.4 °F (36.9 °C) (Temporal)   Resp 16   Ht 5' 4\" (1.626 m)   Wt 66.2 kg (146 lb)   SpO2 98%   BMI 25.06 kg/m²     Physical Exam  Vitals reviewed.   Constitutional:       Appearance: Normal appearance. She is well-developed.   HENT:      Head: Normocephalic and atraumatic.   Eyes:      Conjunctiva/sclera: Conjunctivae normal.      " Pupils: Pupils are equal, round, and reactive to light.   Pulmonary:      Effort: Pulmonary effort is normal. No respiratory distress.   Musculoskeletal:         General: Normal range of motion.      Cervical back: Normal range of motion.   Lymphadenopathy:      Cervical: No cervical adenopathy.   Skin:     General: Skin is dry.   Neurological:      Mental Status: She is alert and oriented to person, place, and time.   Psychiatric:         Behavior: Behavior normal.     Labs: I have reviewed the following labs:  Results for orders placed or performed in visit on 05/17/24   H. pylori antigen, stool   Result Value Ref Range    H pylori Ag, Stl Negative Negative

## 2025-05-08 LAB — IMMUNOPHENOTYPING STUDY: NORMAL

## 2025-05-10 LAB — COPPER SERPL-MCNC: 198 UG/DL

## 2025-08-07 ENCOUNTER — TELEPHONE (OUTPATIENT)
Dept: HEMATOLOGY ONCOLOGY | Facility: CLINIC | Age: 35
End: 2025-08-07

## 2025-08-08 LAB
COPPER SERPL-MCNC: 216 UG/DL (ref 80–158)
CRP SERPL-MCNC: 3.1 MG/L
ERYTHROCYTE [SEDIMENTATION RATE] IN BLOOD BY WESTERGREN METHOD: 9 MM/HR (ref 0–20)
FERRITIN SERPL-MCNC: 9 NG/ML (ref 11–306.8)
IMMUNOPHENOTYPING STUDY: NORMAL
IRON SATN MFR SERPL: 40 % (ref 20–50)
IRON SERPL-MCNC: 174 UG/DL (ref 50–212)
RHEUMATOID FACT SER QL LA: <10 IU/ML
TIBC SERPL-MCNC: 431 UG/DL (ref 260–430)
TRANSFERRIN SERPL-MCNC: 308 MG/DL (ref 203–362)

## 2025-08-11 ENCOUNTER — OFFICE VISIT (OUTPATIENT)
Dept: HEMATOLOGY ONCOLOGY | Facility: CLINIC | Age: 35
End: 2025-08-11
Payer: COMMERCIAL

## 2025-08-12 ENCOUNTER — ANNUAL EXAM (OUTPATIENT)
Dept: OBGYN CLINIC | Facility: CLINIC | Age: 35
End: 2025-08-12
Payer: COMMERCIAL